# Patient Record
Sex: FEMALE | Race: WHITE
[De-identification: names, ages, dates, MRNs, and addresses within clinical notes are randomized per-mention and may not be internally consistent; named-entity substitution may affect disease eponyms.]

---

## 2017-10-25 ENCOUNTER — HOSPITAL ENCOUNTER (OUTPATIENT)
Dept: HOSPITAL 31 - C.SDS | Age: 62
Discharge: HOME | End: 2017-10-25
Attending: ANESTHESIOLOGY
Payer: COMMERCIAL

## 2017-10-25 VITALS
RESPIRATION RATE: 18 BRPM | OXYGEN SATURATION: 100 % | SYSTOLIC BLOOD PRESSURE: 150 MMHG | TEMPERATURE: 97.2 F | HEART RATE: 67 BPM | DIASTOLIC BLOOD PRESSURE: 70 MMHG

## 2017-10-25 VITALS — BODY MASS INDEX: 30.6 KG/M2

## 2017-10-25 DIAGNOSIS — M17.0: Primary | ICD-10-CM

## 2017-10-25 PROCEDURE — 20610 DRAIN/INJ JOINT/BURSA W/O US: CPT

## 2017-10-25 PROCEDURE — 82948 REAGENT STRIP/BLOOD GLUCOSE: CPT

## 2017-10-25 NOTE — RAD
PROCEDURE:  Intraoperative Fluoroscopy. 



HISTORY:

OSTEOARTHRITIS BILATERAL



FINDINGS:

Fluoroscopic assistance was provided for pain management. Please 



fluoroscopic time (continuous mode) utilized during the procedure:

## 2018-02-14 ENCOUNTER — HOSPITAL ENCOUNTER (OUTPATIENT)
Dept: HOSPITAL 31 - C.SDS | Age: 63
Discharge: HOME | End: 2018-02-14
Attending: ANESTHESIOLOGY
Payer: COMMERCIAL

## 2018-02-14 VITALS
SYSTOLIC BLOOD PRESSURE: 137 MMHG | TEMPERATURE: 97.5 F | DIASTOLIC BLOOD PRESSURE: 78 MMHG | RESPIRATION RATE: 12 BRPM | OXYGEN SATURATION: 98 % | HEART RATE: 69 BPM

## 2018-02-14 VITALS — BODY MASS INDEX: 30.6 KG/M2

## 2018-02-14 DIAGNOSIS — M25.562: ICD-10-CM

## 2018-02-14 DIAGNOSIS — M25.561: ICD-10-CM

## 2018-02-14 DIAGNOSIS — M17.0: Primary | ICD-10-CM

## 2018-02-14 PROCEDURE — 64418 NJX AA&/STRD SPRSCAP NRV: CPT

## 2018-02-14 PROCEDURE — 76000 FLUOROSCOPY <1 HR PHYS/QHP: CPT

## 2018-02-14 PROCEDURE — 82948 REAGENT STRIP/BLOOD GLUCOSE: CPT

## 2018-02-14 PROCEDURE — 64450 NJX AA&/STRD OTHER PN/BRANCH: CPT

## 2018-02-14 NOTE — OP
PROCEDURE DATE:



PREOPERATIVE DIAGNOSES:  Right knee pain, left knee pain, osteoarthritis.



POSTOPERATIVE DIAGNOSES:  Right knee pain, left knee pain, osteoarthritis.



PROCEDURE:  Right and left knee genicular nerve block, superomedial,

superolateral and inferomedial branches, x-ray guidance fluoroscopy.



TYPE OF ANESTHESIA:  Local and MAC sedation.



COMPLICATIONS:  None.



ESTIMATED BLOOD LOSS:  2 mL.



INDICATION:  The patient with knee pain with end stage osteoarthritis. 

Pain continues to adversely affect quality of life and activities of daily

living.  The patient was referred to pain management for treatment.



TECHNIQUE:  After comprehensive informed consent was obtained, the risks of

the procedure explained and questions answered.  The patient was placed in

supine position on the operating room table in a comfortable position. 

Confirmation of the procedure to be performed was obtained from the

patient.  The area was prepped and draped in a sterile fashion.  Knee joint

was identified in the AP view, medial and lateral femoral condyle, cortical

lines were focused on.  Sterile drape was placed around the area to be

injected.  The area to be injected was superficially anesthetized with 2 mL

of 1% lidocaine using a 27-gauge needle, 1.25-inch at each level noted

above.  A 22-gauge 3.5 inch spinal needle was used to advance to the medial

and lateral femoral condyle grooves as well as the medial tibial groove. 

AP and lateral views were taken with fluoroscopy to confirmed needle

placement.  Total volume of 10 mL of 0.25% Marcaine with 40 mg Depo-Medrol

was injected with intermittent aspiration.  No heme or CSF was aspirated

throughout.  The patient tolerated the procedure well.  Vital signs

remained stable.  The procedure was repeated on the contralateral knee.



DISPOSITION:  The patient was discharged home in stable condition with

instruction to follow up in 2 weeks.







__________________________________________

Charlene Graham MD





DD:  02/14/2018 11:45:09

DT:  02/14/2018 12:03:45

Job # [id number]

## 2018-02-14 NOTE — RAD
PROCEDURE:  Intraoperative Fluoroscopy. 



HISTORY:

BILAT KNEE OSTEOARTHRITIS



FINDINGS:

Fluoroscopic assistance was provided for bilateral knee block.  Total 

fluoroscopic time (continuous mode) utilized during the procedure: 

30.6 seconds. Total exam DLP:  0.71 (mGy). Please refer to the 

operative report from [facility].